# Patient Record
(demographics unavailable — no encounter records)

---

## 2024-10-28 NOTE — REVIEW OF SYSTEMS
[Fever] : no fever [Headache] : no headache [Weight Gain (___ Lbs)] : no recent weight gain [Chills] : no chills [Feeling Fatigued] : not feeling fatigued [Weight Loss (___ Lbs)] : no recent weight loss [Blurry Vision] : no blurred vision [Sore Throat] : no sore throat [SOB] : no shortness of breath [Dyspnea on exertion] : not dyspnea during exertion [Chest Discomfort] : chest discomfort [Lower Ext Edema] : no extremity edema [Palpitations] : no palpitations [Orthopnea] : no orthopnea [Syncope] : no syncope [Cough] : no cough [Wheezing] : no wheezing [Nausea] : no nausea [Vomiting] : no vomiting [Dizziness] : no dizziness [Confusion] : no confusion was observed [Easy Bleeding] : no tendency for easy bleeding [Easy Bruising] : no tendency for easy bruising [FreeTextEntry5] : see hpi

## 2024-10-28 NOTE — HISTORY OF PRESENT ILLNESS
[FreeTextEntry1] : 22F presents to establish care Sent in by PMD: Dr jones for abnormal ekg  pt went for routine physical exam, found to have an abnormal ekg. pt states 4 years ago she went to a cardiologist in [facility], was told she had "nerve damage to her heart" and she was given a pill to take whenever she had cp. pt does not remember which pill, and states she doesnt take it any more  pt states the pain went away for a few months but came back 2 weeks ago. pt states the pain only comes on with stress, or drinking etoh.  denies CP, SOB on exertion. Denies palpitations, dizziness, diaphoresis, syncope, LE edema, orthopnea  Exercise: none Diet: none  Prev cardiac history: see above Previous cardiac testing: none Recent labs:  EKG: SR TWI inferior leads, V3  Med hx: none	 OBGYN hx: no children. no Hx of miscarriage.  Currently with irregular menstrual cycles Sx hx: none	 Family hx:  no known cardiac hx, no fam hx of SCD Social hx:  from [facility], now lives in [facility], alone.  in a restaurant. no tob/etoh/drugs Meds: none Allergies: nkda

## 2024-10-28 NOTE — DISCUSSION/SUMMARY
[FreeTextEntry1] : 22F presents to establish care  CP -atypical cp, suspect non cardiac -pt with minimal for CAD -no active CP -pt comfortable appearing and euvolemic -ekg showing some TWI -will check TTE to r/o structural heart dz  f/u after initial testing 50 min spent on complete encounter.     [EKG obtained to assist in diagnosis and management of assessed problem(s)] : EKG obtained to assist in diagnosis and management of assessed problem(s)